# Patient Record
Sex: FEMALE | Race: WHITE
[De-identification: names, ages, dates, MRNs, and addresses within clinical notes are randomized per-mention and may not be internally consistent; named-entity substitution may affect disease eponyms.]

---

## 2022-04-09 ENCOUNTER — TRANSCRIPTION ENCOUNTER (OUTPATIENT)
Age: 77
End: 2022-04-09

## 2022-04-12 ENCOUNTER — APPOINTMENT (OUTPATIENT)
Age: 77
End: 2022-04-12

## 2024-06-13 ENCOUNTER — RESULT REVIEW (OUTPATIENT)
Age: 79
End: 2024-06-13

## 2024-06-18 PROBLEM — Z00.00 ENCOUNTER FOR PREVENTIVE HEALTH EXAMINATION: Status: ACTIVE | Noted: 2024-06-18

## 2024-06-20 ENCOUNTER — APPOINTMENT (OUTPATIENT)
Dept: THORACIC SURGERY | Facility: CLINIC | Age: 79
End: 2024-06-20
Payer: MEDICARE

## 2024-06-20 VITALS
RESPIRATION RATE: 18 BRPM | BODY MASS INDEX: 25.61 KG/M2 | HEIGHT: 64 IN | OXYGEN SATURATION: 97 % | HEART RATE: 66 BPM | SYSTOLIC BLOOD PRESSURE: 122 MMHG | DIASTOLIC BLOOD PRESSURE: 56 MMHG | WEIGHT: 150 LBS

## 2024-06-20 PROCEDURE — 99204 OFFICE O/P NEW MOD 45 MIN: CPT

## 2024-06-20 NOTE — PHYSICAL EXAM
[General Appearance - Alert] : alert [General Appearance - In No Acute Distress] : in no acute distress [General Appearance - Well Nourished] : well nourished [Sclera] : the sclera and conjunctiva were normal [Outer Ear] : the ears and nose were normal in appearance [Neck Appearance] : the appearance of the neck was normal [Neck Cervical Mass (___cm)] : no neck mass was observed [Respiration, Rhythm And Depth] : normal respiratory rhythm and effort [Exaggerated Use Of Accessory Muscles For Inspiration] : no accessory muscle use [Heart Rate And Rhythm] : heart rate was normal and rhythm regular [Heart Sounds] : normal S1 and S2 [Examination Of The Chest] : the chest was normal in appearance [Abnormal Walk] : normal gait [] : no rash [No Focal Deficits] : no focal deficits [Oriented To Time, Place, And Person] : oriented to person, place, and time [Impaired Insight] : insight and judgment were intact [Affect] : the affect was normal

## 2024-06-28 NOTE — DATA REVIEWED
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   ---------------------------------------------------------------------------------------- CT chest 5/20/2024   LOW DOSE CT FOR LUNG CANCER SCREENING     CLINICAL STATEMENT: 92484-E68.891: Personal history of nicotine dependence,     C/C:Order Sent from LDCT Screening Template     TECHNIQUE: Multislice axial sections were obtained from the thoracic inlet  through to the domes of the diaphragm without the use of intravenous contrast.  Study was performed with 1.25 mm, 2.5 mm, and 5 mm axial reconstructions.  Sagittal and coronal reformations were also obtained.   Total Exam Dosage: 126.23 mGy-cm     COMPARISON:  5/17/2023 and 1/13/2021     FINDINGS:       BASE OF NECK: Unremarkable     HEART/PERICARDIUM/VASCULAR: The heart size is within normal limits. The thoracic  aorta is normal in caliber. There is coronary artery calcification.     MEDIASTINUM  THORACIC/AXILLARY NODES: Unremarkable     LUNGS/AIRWAYS/PLEURA: No pleural effusion or pneumothorax.     Evaluation of the lung parenchyma demonstrates no evidence of pneumonia. There  is no pulmonary mass. In the right upper lobe anteriorly on series 8 image 82  there is a 3 mm nodule which is stable compared to the 2021 study. In the right  lower lobe on series 8 image 176 there is a 3 mm subpleural nodule which is also  stable compared to the 2021 study.     In the left upper lobe best seen on series 8 image 99 there is a 1.9 cm nodule  with spiculated margins which is suspicious.     In the left lower lobe on image 177 there is a subpleural nodule measuring 3 mm  which is stable compared to the 2021 study.     CHEST WALL/SOFT TISSUES: Unremarkable     UPPER ABDOMEN: Limited assessment due to low dose technique.  Hepatic steatosis.     BONES: Unremarkable     OTHER: None     IMPRESSION:     Left upper lobe 1.9 cm nodule with spiculated margins. This nodule is suspicious  and further characterization with PET/CT is recommended.     Lung-RADS Category 4A: Suspicious     RECOMMENDATION: Follow-up low-dose Chest CT in 3 months; PET/CT may be  considered when there is a greater than or equal to 8 mm solid component.     Lung RADS modifier: None     The findings and recommendation were discussed with the referring physician via  secure messaging application [employer].      Electronically Signed in [employer] By Dr. Gael Harmon MD on 5/20/2024  12:53 PM

## 2024-06-28 NOTE — HISTORY OF PRESENT ILLNESS
[FreeTextEntry1] : 78 year old F patient of Dr. Avila with a PMHx of smoking (45 pack years), and pulmonary nodules that have been stable. She has been followed with low dose CTs for lung cancer screening.  CT chest 5/20/2024 as follows: IMPRESSION:    Left upper lobe 1.9 cm nodule with spiculated margins. This nodule is suspicious  and further characterization with PET/CT is recommended.  Lung-RADS Category 4A: Suspicious     RECOMMENDATION: Follow-up low-dose Chest CT in 3 months; PET/CT may be  considered when there is a greater than or equal to 8 mm solid component.  Lung RADS modifier: None   She then went for a CT guided lung biopsy 6/13/2024 for the 1.9 cm ADRIAN nodule which was consistent with adenocarcinoma with lepidic changes. She was ordered a PET/CT (pending) and recommended for surgical consultation as well as rad/onc consultation. She now presents for evaluation.   She is currently asymptomatic and states that she is feeling well.   PCP: Dr. Renee Cardiologist: Dr. Heck Pulm: Dr. Avila

## 2024-06-28 NOTE — ASSESSMENT
[FreeTextEntry1] : Patient with 1.9 cm left upper lobe lung cancer non small cell.  Options for treatment discussed including resection and SBRT.  Risks and benefits discussed.  The patient is leaning towards SBRT.  She will decide and let our office know.  All questions were answered.

## 2024-07-01 PROBLEM — Z80.1 FAMILY HISTORY OF LUNG CANCER: Status: ACTIVE | Noted: 2024-07-01

## 2024-07-01 RX ORDER — HYDROCHLOROTHIAZIDE 12.5 MG/1
12.5 TABLET ORAL
Refills: 0 | Status: ACTIVE | COMMUNITY

## 2024-07-01 RX ORDER — ATORVASTATIN CALCIUM 80 MG/1
80 TABLET, FILM COATED ORAL
Refills: 0 | Status: ACTIVE | COMMUNITY

## 2024-07-01 RX ORDER — SERTRALINE HYDROCHLORIDE 25 MG/1
25 TABLET, FILM COATED ORAL
Refills: 0 | Status: ACTIVE | COMMUNITY

## 2024-07-01 RX ORDER — EZETIMIBE 10 MG/1
10 TABLET ORAL
Refills: 0 | Status: ACTIVE | COMMUNITY

## 2024-07-01 RX ORDER — IPRATROPIUM BROMIDE 18 MCG
AEROSOL WITH ADAPTER (GRAM) INHALATION
Refills: 0 | Status: ACTIVE | COMMUNITY

## 2024-07-02 ENCOUNTER — APPOINTMENT (OUTPATIENT)
Dept: RADIATION ONCOLOGY | Facility: CLINIC | Age: 79
End: 2024-07-02
Payer: MEDICARE

## 2024-07-02 VITALS
HEIGHT: 64 IN | WEIGHT: 150 LBS | BODY MASS INDEX: 25.61 KG/M2 | SYSTOLIC BLOOD PRESSURE: 125 MMHG | DIASTOLIC BLOOD PRESSURE: 77 MMHG | RESPIRATION RATE: 16 BRPM | OXYGEN SATURATION: 95 % | HEART RATE: 68 BPM

## 2024-07-02 DIAGNOSIS — E78.5 HYPERLIPIDEMIA, UNSPECIFIED: ICD-10-CM

## 2024-07-02 DIAGNOSIS — I10 ESSENTIAL (PRIMARY) HYPERTENSION: ICD-10-CM

## 2024-07-02 DIAGNOSIS — I25.10 ATHEROSCLEROTIC HEART DISEASE OF NATIVE CORONARY ARTERY W/OUT ANGINA PECTORIS: ICD-10-CM

## 2024-07-02 DIAGNOSIS — C34.12 MALIGNANT NEOPLASM OF UPPER LOBE, LEFT BRONCHUS OR LUNG: ICD-10-CM

## 2024-07-02 DIAGNOSIS — Z80.1 FAMILY HISTORY OF MALIGNANT NEOPLASM OF TRACHEA, BRONCHUS AND LUNG: ICD-10-CM

## 2024-07-02 PROCEDURE — 99204 OFFICE O/P NEW MOD 45 MIN: CPT

## 2024-07-02 RX ORDER — ALENDRONATE SODIUM 70 MG/1
TABLET ORAL
Refills: 0 | Status: ACTIVE | COMMUNITY

## 2024-07-02 RX ORDER — ASPIRIN 81 MG/1
81 TABLET, CHEWABLE ORAL
Refills: 0 | Status: DISCONTINUED | COMMUNITY
End: 2024-07-02

## 2024-07-23 ENCOUNTER — NON-APPOINTMENT (OUTPATIENT)
Age: 79
End: 2024-07-23

## 2024-07-23 VITALS
DIASTOLIC BLOOD PRESSURE: 80 MMHG | SYSTOLIC BLOOD PRESSURE: 128 MMHG | HEART RATE: 72 BPM | OXYGEN SATURATION: 98 % | RESPIRATION RATE: 16 BRPM | TEMPERATURE: 98 F

## 2024-07-23 NOTE — REVIEW OF SYSTEMS
[Fatigue: Grade 0] : Fatigue: Grade 0 [Cough: Grade 0] : Cough: Grade 0 [Dyspnea: Grade 0] : Dyspnea: Grade 0 [Hoarseness: Grade 0] : Hoarseness: Grade 0 [Pruritus: Grade 0] : Pruritus: Grade 0 [Skin Hyperpigmentation: Grade 0] : Skin Hyperpigmentation: Grade 0 [Dermatitis Radiation: Grade 0] : Dermatitis Radiation: Grade 0

## 2024-07-24 NOTE — HISTORY OF PRESENT ILLNESS
[FreeTextEntry1] : Ms. Mortimer is an 78-year-old female with newly diagnosed left upper lobe non-small cell lung cancer.  She has history of 45-pack years of smoking and stable pulmonary nodules and is being followed by lose-dose CTs for lung cancer screening.  05/17/23 CT for lung cancer screening at [facility] showed a probable mucous plugging in the ADRIAN with small increasing nodular component less than 4mm. Given the overall long-term stability favored to be stigmata of peripheral mucous plugging/bronchiolitic changes. Other scattered pulmonary nodules are stable.  02/27/24 PFT (Dr. Avila, [facility]) showed mild obstructive airways disease with no significant bronchodilator response noted. Normal lung volumes. Reduced DLCO, 72% predicted.  05/20/24 CT for lung cancer screening ([facility]) revealed a ADRIAN 1.9cm nodule with spiculated margins. Lung-RADS category 4A: suspicious.  06/13/24 left upper lung nodule biopsy at [facility]. Pathology revealed adenocarcinoma, lepidic predominant.  06/26/24 PET/CT at [facility] demonstrated a minimal metabolic activity of the ADRIAN, SUV max 1.5. Left lobe thyroid nodule with mild hypermetabolic activity measuring ~ 1.3cm, SUV max 3.2. No hypermetabolic activity of the osseous structures.  7/23/2024 Ms. Ann Mortimer presents today for her OTV, completed 1/5 fractions to the ADRIAN to a dose of 1000 cGy. She is overall feeling well. We discussed her skin care and the use of cream at the site BID. She has some Aquaphor and will use that from home. We discussed fatigue as a side effect lasting 6-8 weeks post treatment.

## 2024-07-24 NOTE — HISTORY OF PRESENT ILLNESS
[FreeTextEntry1] : Ms. Mortimer is an 78-year-old female with newly diagnosed left upper lobe non-small cell lung cancer.  She has history of 45-pack years of smoking and stable pulmonary nodules and is being followed by lose-dose CTs for lung cancer screening.  05/17/23 CT for lung cancer screening at [facility] showed a probable mucous plugging in the ADRIAN with small increasing nodular component less than 4mm. Given the overall long-term stability favored to be stigmata of peripheral mucous plugging/bronchiolitic changes. Other scattered pulmonary nodules are stable.  02/27/24 PFT (Dr. Avila, [facility]) showed mild obstructive airways disease with no significant bronchodilator response noted. Normal lung volumes. Reduced DLCO, 72% predicted.  05/20/24 CT for lung cancer screening ([facility]) revealed a ADRIAN 1.9cm nodule with spiculated margins. Lung-RADS category 4A: suspicious.  06/13/24 left upper lung nodule biopsy at [facility]. Pathology revealed adenocarcinoma, lepidic predominant.  06/26/24 PET/CT at [facility] demonstrated a minimal metabolic activity of the ADRIAN, SUV max 1.5. Left lobe thyroid nodule with mild hypermetabolic activity measuring ~ 1.3cm, SUV max 3.2. No hypermetabolic activity of the osseous structures.  7/23/2024 Ms. Ann Mortimer presents today for her OTV, completed 1/5 fractions to the ADRIAN to a dose of 1000 cGy. She is overall feeling well. We discussed her skin care and the use of cream at the site BID. She has some Aquaphor and will use that from home. We discussed fatigue as a side effect lasting 6-8 weeks post treatment.

## 2024-07-24 NOTE — DISEASE MANAGEMENT
[Clinical] : TNM Stage: c [I] : I [TTNM] : 1 [NTNM] : 0 [MTNM] : 0 [de-identified] : completed 1/5 fractions to the ADRIAN to a dose of 1000 cGy

## 2024-07-24 NOTE — DISEASE MANAGEMENT
[Clinical] : TNM Stage: c [I] : I [TTNM] : 1 [NTNM] : 0 [MTNM] : 0 [de-identified] : completed 1/5 fractions to the ADRIAN to a dose of 1000 cGy

## 2024-07-31 ENCOUNTER — NON-APPOINTMENT (OUTPATIENT)
Age: 79
End: 2024-07-31

## 2024-07-31 VITALS
HEIGHT: 64 IN | WEIGHT: 150 LBS | OXYGEN SATURATION: 96 % | DIASTOLIC BLOOD PRESSURE: 83 MMHG | SYSTOLIC BLOOD PRESSURE: 123 MMHG | RESPIRATION RATE: 16 BRPM | BODY MASS INDEX: 25.61 KG/M2 | HEART RATE: 75 BPM

## 2024-07-31 DIAGNOSIS — C34.12 MALIGNANT NEOPLASM OF UPPER LOBE, LEFT BRONCHUS OR LUNG: ICD-10-CM

## 2024-07-31 NOTE — HISTORY OF PRESENT ILLNESS
[FreeTextEntry1] : Ms. Mortimer is an 78-year-old female with newly diagnosed left upper lobe non-small cell lung cancer.  She has history of 45-pack years of smoking and stable pulmonary nodules and is being followed by lose-dose CTs for lung cancer screening.  05/17/23 CT for lung cancer screening at [facility] showed a probable mucous plugging in the ADRIAN with small increasing nodular component less than 4mm. Given the overall long-term stability favored to be stigmata of peripheral mucous plugging/bronchiolitic changes. Other scattered pulmonary nodules are stable.  02/27/24 PFT (Dr. Avila, [facility]) showed mild obstructive airways disease with no significant bronchodilator response noted. Normal lung volumes. Reduced DLCO, 72% predicted.  05/20/24 CT for lung cancer screening ([facility]) revealed a ADRIAN 1.9cm nodule with spiculated margins. Lung-RADS category 4A: suspicious.  06/13/24 left upper lung nodule biopsy at [facility]. Pathology revealed adenocarcinoma, lepidic predominant.  06/26/24 PET/CT at [facility] demonstrated a minimal metabolic activity of the ADRIAN, SUV max 1.5. Left lobe thyroid nodule with mild hypermetabolic activity measuring ~ 1.3cm, SUV max 3.2. No hypermetabolic activity of the osseous structures.  7/23/2024 Ms. Ann Mortimer presents today for her OTV, completed 1/5 fractions to the ADRIAN to a dose of 1000 cGy. She is overall feeling well. We discussed her skin care and the use of cream at the site BID. She has some Aquaphor and will use that from home. We discussed fatigue as a side effect lasting 6-8 weeks post treatment.   7/31/2024 Ms Mortimer presents today for her OTV.  She completed 4/5 fractions to the ADRIAN.  She denies any pain, coughing or shortness of breath.  She has been using skin moisterizer twice a day.  Her appetite is healthy.

## 2024-07-31 NOTE — REVIEW OF SYSTEMS
[Fatigue: Grade 0] : Fatigue: Grade 0 [Cough: Grade 0] : Cough: Grade 0 [Dyspnea: Grade 0] : Dyspnea: Grade 0 [Pneumonitis: Grade 0] : Pneumonitis: Grade 0

## 2024-07-31 NOTE — DISEASE MANAGEMENT
[Clinical] : TNM Stage: c [I] : I [TTNM] : 1 [NTNM] : 0 [MTNM] : 0 [de-identified] : 3728 [de-identified] : 9403 [de-identified] : Left lung

## 2024-08-19 NOTE — DISEASE MANAGEMENT
[Clinical] : TNM Stage: c [TTNM] : 1 [NTNM] : 0 [MTNM] : 0 [I] : I [de-identified] : 5337 [de-identified] : 0739 [de-identified] : Left lung

## 2024-08-19 NOTE — HISTORY OF PRESENT ILLNESS
[FreeTextEntry1] : Ms. Mortimer is an 78-year-old female with newly diagnosed left upper lobe non-small cell lung cancer.  She has history of 45-pack years of smoking and stable pulmonary nodules and is being followed by lose-dose CTs for lung cancer screening.  05/17/23 CT for lung cancer screening at [facility] showed a probable mucous plugging in the ADRIAN with small increasing nodular component less than 4mm. Given the overall long-term stability favored to be stigmata of peripheral mucous plugging/bronchiolitic changes. Other scattered pulmonary nodules are stable.  02/27/24 PFT (Dr. Avila, [facility]) showed mild obstructive airways disease with no significant bronchodilator response noted. Normal lung volumes. Reduced DLCO, 72% predicted.  05/20/24 CT for lung cancer screening ([facility]) revealed a ADRIAN 1.9cm nodule with spiculated margins. Lung-RADS category 4A: suspicious.  06/13/24 left upper lung nodule biopsy at [facility]. Pathology revealed adenocarcinoma, lepidic predominant.  06/26/24 PET/CT at [facility] demonstrated a minimal metabolic activity of the ADRIAN, SUV max 1.5. Left lobe thyroid nodule with mild hypermetabolic activity measuring ~ 1.3cm, SUV max 3.2. No hypermetabolic activity of the osseous structures.  7/23/2024 Ms. Ann Mortimer presents today for her OTV, completed 1/5 fractions to the ADRIAN to a dose of 1000 cGy. She is overall feeling well. We discussed her skin care and the use of cream at the site BID. She has some Aquaphor and will use that from home. We discussed fatigue as a side effect lasting 6-8 weeks post treatment.   7/31/2024 Ms Mortimer presents today for her OTV.  She completed 4/5 fractions to the ADRIAN.  She denies any pain, coughing or shortness of breath.  She has been using skin moisterizer twice a day.  Her appetite is healthy.  8/30/2024  Mrs. Mortimer is now s/p Rt 5 Fxs to 50 GY to the Left Lung SBRT completed on 8/2/2024.   She returns today for her first follow up.

## 2024-08-30 ENCOUNTER — NON-APPOINTMENT (OUTPATIENT)
Age: 79
End: 2024-08-30

## 2024-08-30 ENCOUNTER — APPOINTMENT (OUTPATIENT)
Dept: RADIATION ONCOLOGY | Facility: CLINIC | Age: 79
End: 2024-08-30
Payer: MEDICARE

## 2024-08-30 VITALS
TEMPERATURE: 98 F | OXYGEN SATURATION: 96 % | RESPIRATION RATE: 16 BRPM | SYSTOLIC BLOOD PRESSURE: 127 MMHG | HEART RATE: 72 BPM | DIASTOLIC BLOOD PRESSURE: 77 MMHG

## 2024-08-30 DIAGNOSIS — C34.12 MALIGNANT NEOPLASM OF UPPER LOBE, LEFT BRONCHUS OR LUNG: ICD-10-CM

## 2024-08-30 PROCEDURE — 99212 OFFICE O/P EST SF 10 MIN: CPT

## 2024-08-30 NOTE — HISTORY OF PRESENT ILLNESS
[FreeTextEntry1] : Ms. Mortimer is an 78-year-old female with newly diagnosed left upper lobe non-small cell lung cancer.  She has history of 45-pack years of smoking and stable pulmonary nodules and is being followed by lose-dose CTs for lung cancer screening.  05/17/23 CT for lung cancer screening at [facility] showed a probable mucous plugging in the ADRIAN with small increasing nodular component less than 4mm. Given the overall long-term stability favored to be stigmata of peripheral mucous plugging/bronchiolitic changes. Other scattered pulmonary nodules are stable.  02/27/24 PFT (Dr. Avila, [facility]) showed mild obstructive airways disease with no significant bronchodilator response noted. Normal lung volumes. Reduced DLCO, 72% predicted.  05/20/24 CT for lung cancer screening ([facility]) revealed a ADRIAN 1.9cm nodule with spiculated margins. Lung-RADS category 4A: suspicious.  06/13/24 left upper lung nodule biopsy at [facility]. Pathology revealed adenocarcinoma, lepidic predominant.  06/26/24 PET/CT at [facility] demonstrated a minimal metabolic activity of the ADRIAN, SUV max 1.5. Left lobe thyroid nodule with mild hypermetabolic activity measuring ~ 1.3cm, SUV max 3.2. No hypermetabolic activity of the osseous structures.  7/23/2024 Ms. Ann Mortimer presents today for her OTV, completed 1/5 fractions to the ADRIAN to a dose of 1000 cGy. She is overall feeling well. We discussed her skin care and the use of cream at the site BID. She has some Aquaphor and will use that from home. We discussed fatigue as a side effect lasting 6-8 weeks post treatment.   8/30/2024  Mrs. Mortimer is now s/p Rt 5 Fxs to 50 GY to the Left Lung SBRT completed on 8/2/2024.   She returns today for her first follow up.  She is overall feeling well. She denies any cough, SOB, HOWARD or chest pain. She will make a follow up appointment with Dr. Avila after she returns to us in 3 months after her imaging.

## 2024-08-30 NOTE — DISEASE MANAGEMENT
[TTNM] : 1 [NTNM] : 0 [MTNM] : 0 [de-identified] : 5662 [de-identified] : 2245 [de-identified] : Left lung

## 2024-08-30 NOTE — HISTORY OF PRESENT ILLNESS
[FreeTextEntry1] : Ms. Mortimer is an 78-year-old female with newly diagnosed left upper lobe non-small cell lung cancer.  She has history of 45-pack years of smoking and stable pulmonary nodules and is being followed by lose-dose CTs for lung cancer screening.  05/17/23 CT for lung cancer screening at [facility] showed a probable mucous plugging in the ADRIAN with small increasing nodular component less than 4mm. Given the overall long-term stability favored to be stigmata of peripheral mucous plugging/bronchiolitic changes. Other scattered pulmonary nodules are stable.  02/27/24 PFT (Dr. Avila, [facility]) showed mild obstructive airways disease with no significant bronchodilator response noted. Normal lung volumes. Reduced DLCO, 72% predicted.  05/20/24 CT for lung cancer screening ([facility]) revealed a ADRIAN 1.9cm nodule with spiculated margins. Lung-RADS category 4A: suspicious.  06/13/24 left upper lung nodule biopsy at [facility]. Pathology revealed adenocarcinoma, lepidic predominant.  06/26/24 PET/CT at [facility] demonstrated a minimal metabolic activity of the ADRIAN, SUV max 1.5. Left lobe thyroid nodule with mild hypermetabolic activity measuring ~ 1.3cm, SUV max 3.2. No hypermetabolic activity of the osseous structures.  7/23/2024 Ms. Ann Mortimer presents today for her OTV, completed 1/5 fractions to the ADRIAN to a dose of 1000 cGy. She is overall feeling well. We discussed her skin care and the use of cream at the site BID. She has some Aquaphor and will use that from home. We discussed fatigue as a side effect lasting 6-8 weeks post treatment.   8/30/2024  Mrs. Mortimer is now s/p Rt 5 Fxs to 50 GY to the Left Lung SBRT completed on 8/2/2024.   She returns today for her first follow up.  She is overall feeling well. She denies any cough, SOB, HOAWRD or chest pain. She will make a follow up appointment with Dr. Avila after she returns to us in 3 months after her imaging.

## 2024-08-30 NOTE — DISEASE MANAGEMENT
[TTNM] : 1 [NTNM] : 0 [MTNM] : 0 [de-identified] : 5444 [de-identified] : 5642 [de-identified] : Left lung

## 2024-08-30 NOTE — REVIEW OF SYSTEMS
[Fatigue: Grade 0] : Fatigue: Grade 0 [Cough: Grade 0] : Cough: Grade 0 [Dyspnea: Grade 0] : Dyspnea: Grade 0 [Hoarseness: Grade 0] : Hoarseness: Grade 0 [Dermatitis Radiation: Grade 0] : Dermatitis Radiation: Grade 0

## 2024-12-03 ENCOUNTER — NON-APPOINTMENT (OUTPATIENT)
Age: 79
End: 2024-12-03

## 2024-12-03 ENCOUNTER — APPOINTMENT (OUTPATIENT)
Dept: RADIATION ONCOLOGY | Facility: CLINIC | Age: 79
End: 2024-12-03
Payer: MEDICARE

## 2024-12-03 VITALS
DIASTOLIC BLOOD PRESSURE: 80 MMHG | HEART RATE: 72 BPM | OXYGEN SATURATION: 97 % | RESPIRATION RATE: 16 BRPM | SYSTOLIC BLOOD PRESSURE: 116 MMHG

## 2024-12-03 DIAGNOSIS — C34.12 MALIGNANT NEOPLASM OF UPPER LOBE, LEFT BRONCHUS OR LUNG: ICD-10-CM

## 2024-12-03 PROCEDURE — 99213 OFFICE O/P EST LOW 20 MIN: CPT

## 2025-06-13 ENCOUNTER — RESULT REVIEW (OUTPATIENT)
Age: 80
End: 2025-06-13

## 2025-06-18 ENCOUNTER — NON-APPOINTMENT (OUTPATIENT)
Age: 80
End: 2025-06-18

## 2025-06-20 ENCOUNTER — NON-APPOINTMENT (OUTPATIENT)
Age: 80
End: 2025-06-20

## 2025-06-20 ENCOUNTER — APPOINTMENT (OUTPATIENT)
Dept: RADIATION ONCOLOGY | Facility: CLINIC | Age: 80
End: 2025-06-20
Payer: MEDICARE

## 2025-06-20 VITALS
OXYGEN SATURATION: 95 % | TEMPERATURE: 98 F | SYSTOLIC BLOOD PRESSURE: 125 MMHG | HEART RATE: 71 BPM | DIASTOLIC BLOOD PRESSURE: 74 MMHG | RESPIRATION RATE: 14 BRPM

## 2025-06-20 PROCEDURE — 99213 OFFICE O/P EST LOW 20 MIN: CPT

## 2025-06-20 RX ORDER — ASPIRIN 81 MG
81 TABLET, DELAYED RELEASE (ENTERIC COATED) ORAL
Refills: 0 | Status: ACTIVE | COMMUNITY